# Patient Record
Sex: MALE | Race: WHITE | NOT HISPANIC OR LATINO | URBAN - METROPOLITAN AREA
[De-identification: names, ages, dates, MRNs, and addresses within clinical notes are randomized per-mention and may not be internally consistent; named-entity substitution may affect disease eponyms.]

---

## 2023-10-30 ENCOUNTER — OFFICE VISIT (OUTPATIENT)
Dept: URBAN - METROPOLITAN AREA CLINIC 72 | Facility: CLINIC | Age: 53
End: 2023-10-30
Payer: COMMERCIAL

## 2023-10-30 ENCOUNTER — DASHBOARD ENCOUNTERS (OUTPATIENT)
Age: 53
End: 2023-10-30

## 2023-10-30 ENCOUNTER — LAB OUTSIDE AN ENCOUNTER (OUTPATIENT)
Dept: URBAN - METROPOLITAN AREA CLINIC 72 | Facility: CLINIC | Age: 53
End: 2023-10-30

## 2023-10-30 VITALS
BODY MASS INDEX: 29.07 KG/M2 | HEIGHT: 72 IN | HEART RATE: 70 BPM | TEMPERATURE: 97.3 F | SYSTOLIC BLOOD PRESSURE: 159 MMHG | WEIGHT: 214.6 LBS | DIASTOLIC BLOOD PRESSURE: 97 MMHG

## 2023-10-30 DIAGNOSIS — R11.0 NAUSEA: ICD-10-CM

## 2023-10-30 DIAGNOSIS — R10.13 DYSPEPSIA: ICD-10-CM

## 2023-10-30 DIAGNOSIS — R10.84 GENERALIZED ABDOMINAL PAIN: ICD-10-CM

## 2023-10-30 DIAGNOSIS — R19.7 ACUTE DIARRHEA: ICD-10-CM

## 2023-10-30 PROBLEM — 428283002: Status: ACTIVE | Noted: 2023-10-30

## 2023-10-30 PROCEDURE — 99204 OFFICE O/P NEW MOD 45 MIN: CPT | Performed by: INTERNAL MEDICINE

## 2023-10-30 RX ORDER — OMEPRAZOLE 20 MG/1
1 CAPSULE 30 MINUTES BEFORE MORNING MEAL CAPSULE, DELAYED RELEASE ORAL ONCE A DAY
Qty: 30 | Refills: 5 | OUTPATIENT
Start: 2023-10-30

## 2023-10-30 RX ORDER — DICYCLOMINE HYDROCHLORIDE 20 MG/1
1 TABLET TABLET ORAL THREE TIMES A DAY
Qty: 90 | Refills: 1 | OUTPATIENT
Start: 2023-10-30 | End: 2023-12-28

## 2023-10-30 NOTE — HPI-TODAY'S VISIT:
53-year-old male new to the clinic here for digestive problem.  Past medical history of hypertension.  Was seen in the ER at [facility] in January for chest pain.  Cardiac cause ruled out.  Does drink alcohol and pain was worse after drinking alcohol. Chest X-ray is normal.  Labs normal.  He was advised to follow-up with Dr. Barakat.  Today he reports feeling bloating.  Has indigestion.  Takes imodium rarely.  He drinks 6 pack of beer a day.  Diarrhea most days.  Up 5 a day. Type 6-7.  No nocturnal symptoms. Generalized abdominal pain at times.  Loose stools the past 3 weeks with nausea.  No dysphagia.  No melena, hematochezia, oily stools or mucus.

## 2023-10-30 NOTE — HPI-OTHER HISTORIES
Labs 1/22/2023. CBC: Normal. CMP: Normal. D-dimer normal.  Last colonoscopy 2012 in [place]. Had polyps removed.

## 2023-11-08 ENCOUNTER — TELEPHONE ENCOUNTER (OUTPATIENT)
Dept: URBAN - METROPOLITAN AREA CLINIC 72 | Facility: CLINIC | Age: 53
End: 2023-11-08

## 2023-11-08 ENCOUNTER — LAB OUTSIDE AN ENCOUNTER (OUTPATIENT)
Dept: URBAN - METROPOLITAN AREA CLINIC 72 | Facility: CLINIC | Age: 53
End: 2023-11-08

## 2023-11-08 PROBLEM — 3855007: Status: ACTIVE | Noted: 2023-11-08

## 2023-11-30 ENCOUNTER — OFFICE VISIT (OUTPATIENT)
Dept: URBAN - METROPOLITAN AREA CLINIC 72 | Facility: CLINIC | Age: 53
End: 2023-11-30

## 2023-12-12 ENCOUNTER — OFFICE VISIT (OUTPATIENT)
Dept: URBAN - METROPOLITAN AREA MEDICAL CENTER 19 | Facility: MEDICAL CENTER | Age: 53
End: 2023-12-12
Payer: COMMERCIAL

## 2023-12-12 DIAGNOSIS — R93.3 ABN FINDINGS-GI TRACT: ICD-10-CM

## 2023-12-12 DIAGNOSIS — K31.7 BENIGN GASTRIC POLYP: ICD-10-CM

## 2023-12-12 DIAGNOSIS — K29.60 ADENOPAPILLOMATOSIS GASTRICA: ICD-10-CM

## 2023-12-12 PROCEDURE — 43259 EGD US EXAM DUODENUM/JEJUNUM: CPT | Performed by: INTERNAL MEDICINE

## 2023-12-12 PROCEDURE — 43239 EGD BIOPSY SINGLE/MULTIPLE: CPT | Performed by: INTERNAL MEDICINE

## 2023-12-12 RX ORDER — OMEPRAZOLE 20 MG/1
1 CAPSULE 30 MINUTES BEFORE MORNING MEAL CAPSULE, DELAYED RELEASE ORAL ONCE A DAY
Qty: 30 | Refills: 5 | Status: ACTIVE | COMMUNITY
Start: 2023-10-30

## 2023-12-12 RX ORDER — DICYCLOMINE HYDROCHLORIDE 20 MG/1
1 TABLET TABLET ORAL THREE TIMES A DAY
Qty: 90 | Refills: 1 | Status: ACTIVE | COMMUNITY
Start: 2023-10-30 | End: 2023-12-28

## 2023-12-19 ENCOUNTER — OFFICE VISIT (OUTPATIENT)
Dept: URBAN - METROPOLITAN AREA CLINIC 72 | Facility: CLINIC | Age: 53
End: 2023-12-19

## 2023-12-19 RX ORDER — DICYCLOMINE HYDROCHLORIDE 20 MG/1
1 TABLET TABLET ORAL THREE TIMES A DAY
Qty: 90 | Refills: 1 | Status: ACTIVE | COMMUNITY
Start: 2023-10-30 | End: 2023-12-28

## 2023-12-19 RX ORDER — OMEPRAZOLE 20 MG/1
1 CAPSULE 30 MINUTES BEFORE MORNING MEAL CAPSULE, DELAYED RELEASE ORAL ONCE A DAY
Qty: 30 | Refills: 5 | Status: ACTIVE | COMMUNITY
Start: 2023-10-30

## 2023-12-19 NOTE — HPI-OTHER HISTORIES
Labs 1/22/2023. CBC: Normal. CMP: Normal. D-dimer normal. . Last colonoscopy 2012 in [place]. Had polyps removed. . EUS/EGD 12/12/2023.  Esophagus was normal.  Patchy mild erythematous mucosa found in antrum.  4 mm sessile polyp found in gastric body.  Biopsies performed.  Duodenum was normal, ampulla was normal.  US revealed patchy areas of hyperechogenicity and nonshadowing hyperechoic strands throughout the pancreas no mass visualized.  Changes may be related to early chronic pancreatitis or fatty infiltration.  Fatty liver.  Call cessation encouraged.  Recommend CT scan in 6 months for further surveillance. . CT abdomen and pelvis with contrast 11/6/2023 revealed descending and sigmoid colon decompressed with borderline mural thickening possibly mild colitis without significant pericolic inflammation.  12 mm structure projecting from anterior aspect of pancreatic tail isodense to pancreatic parenchyma and likely developmental accessory/lobulated tissue.

## 2023-12-19 NOTE — HPI-TODAY'S VISIT:
53-year-old male here for a follow-up appointment.    Last seen 10/30/2023 for acute diarrhea, generalized abdominal pain, nausea, dyspepsia, alcohol use and history of colon polyps.  Started on dicyclomine for diarrhea, stool studies ordered.  For nausea put on Zofran and for dyspepsia started on omeprazole 20 mg daily.  CT, EGD and colonoscopy ordered for further evaluation.  Was advised to cut his alcohol intake in half. EGD/colonoscopy scheduled for 3/4/2024.  Past medical history of hypertension.  Last note:  Today he reports feeling bloating.  Has indigestion.  Takes imodium rarely.  He drinks 6 pack of beer a day.  Diarrhea most days.  Up 5 a day. Type 6-7.  No nocturnal symptoms. Generalized abdominal pain at times.  Loose stools the past 3 weeks with nausea.  No dysphagia.  No melena, hematochezia, oily stools or mucus.

## 2023-12-20 ENCOUNTER — TELEPHONE ENCOUNTER (OUTPATIENT)
Dept: URBAN - METROPOLITAN AREA CLINIC 113 | Facility: CLINIC | Age: 53
End: 2023-12-20

## 2023-12-21 ENCOUNTER — LAB OUTSIDE AN ENCOUNTER (OUTPATIENT)
Dept: URBAN - METROPOLITAN AREA CLINIC 113 | Facility: CLINIC | Age: 53
End: 2023-12-21

## 2024-03-04 ENCOUNTER — COLON (OUTPATIENT)
Dept: URBAN - METROPOLITAN AREA MEDICAL CENTER 40 | Facility: MEDICAL CENTER | Age: 54
End: 2024-03-04

## 2024-03-04 RX ORDER — OMEPRAZOLE 20 MG/1
1 CAPSULE 30 MINUTES BEFORE MORNING MEAL CAPSULE, DELAYED RELEASE ORAL ONCE A DAY
Qty: 30 | Refills: 5 | Status: ACTIVE | COMMUNITY
Start: 2023-10-30